# Patient Record
Sex: MALE | Race: BLACK OR AFRICAN AMERICAN | NOT HISPANIC OR LATINO
[De-identification: names, ages, dates, MRNs, and addresses within clinical notes are randomized per-mention and may not be internally consistent; named-entity substitution may affect disease eponyms.]

---

## 2021-07-08 ENCOUNTER — APPOINTMENT (OUTPATIENT)
Dept: ANTEPARTUM | Facility: CLINIC | Age: 36
End: 2021-07-08
Payer: COMMERCIAL

## 2021-07-08 PROBLEM — Z00.00 ENCOUNTER FOR PREVENTIVE HEALTH EXAMINATION: Status: ACTIVE | Noted: 2021-07-08

## 2021-07-08 PROCEDURE — 99072 ADDL SUPL MATRL&STAF TM PHE: CPT

## 2021-07-08 PROCEDURE — 36415 COLL VENOUS BLD VENIPUNCTURE: CPT

## 2021-07-27 ENCOUNTER — NON-APPOINTMENT (OUTPATIENT)
Age: 36
End: 2021-07-27

## 2022-02-07 ENCOUNTER — EMERGENCY (EMERGENCY)
Facility: HOSPITAL | Age: 37
LOS: 1 days | Discharge: HOME | End: 2022-02-07
Attending: STUDENT IN AN ORGANIZED HEALTH CARE EDUCATION/TRAINING PROGRAM | Admitting: STUDENT IN AN ORGANIZED HEALTH CARE EDUCATION/TRAINING PROGRAM
Payer: COMMERCIAL

## 2022-02-07 VITALS
TEMPERATURE: 99 F | DIASTOLIC BLOOD PRESSURE: 81 MMHG | SYSTOLIC BLOOD PRESSURE: 123 MMHG | HEART RATE: 85 BPM | RESPIRATION RATE: 16 BRPM | OXYGEN SATURATION: 98 %

## 2022-02-07 VITALS
DIASTOLIC BLOOD PRESSURE: 91 MMHG | HEART RATE: 117 BPM | SYSTOLIC BLOOD PRESSURE: 139 MMHG | OXYGEN SATURATION: 100 % | RESPIRATION RATE: 20 BRPM | TEMPERATURE: 97 F

## 2022-02-07 LAB
ALBUMIN SERPL ELPH-MCNC: 3.5 G/DL — SIGNIFICANT CHANGE UP (ref 3.3–5)
ALP SERPL-CCNC: 93 U/L — SIGNIFICANT CHANGE UP (ref 40–120)
ALT FLD-CCNC: 25 U/L — SIGNIFICANT CHANGE UP (ref 4–41)
ANION GAP SERPL CALC-SCNC: 15 MMOL/L — HIGH (ref 7–14)
APPEARANCE UR: ABNORMAL
AST SERPL-CCNC: 24 U/L — SIGNIFICANT CHANGE UP (ref 4–40)
BACTERIA # UR AUTO: ABNORMAL
BASE EXCESS BLDV CALC-SCNC: 0.4 MMOL/L — SIGNIFICANT CHANGE UP (ref -2–3)
BASOPHILS # BLD AUTO: 0.02 K/UL — SIGNIFICANT CHANGE UP (ref 0–0.2)
BASOPHILS NFR BLD AUTO: 0.3 % — SIGNIFICANT CHANGE UP (ref 0–2)
BILIRUB SERPL-MCNC: 0.3 MG/DL — SIGNIFICANT CHANGE UP (ref 0.2–1.2)
BILIRUB UR-MCNC: NEGATIVE — SIGNIFICANT CHANGE UP
BLOOD GAS VENOUS COMPREHENSIVE RESULT: SIGNIFICANT CHANGE UP
BUN SERPL-MCNC: 8 MG/DL — SIGNIFICANT CHANGE UP (ref 7–23)
CALCIUM SERPL-MCNC: 9.2 MG/DL — SIGNIFICANT CHANGE UP (ref 8.4–10.5)
CHLORIDE BLDV-SCNC: 100 MMOL/L — SIGNIFICANT CHANGE UP (ref 96–108)
CHLORIDE SERPL-SCNC: 94 MMOL/L — LOW (ref 98–107)
CO2 BLDV-SCNC: 25.7 MMOL/L — SIGNIFICANT CHANGE UP (ref 22–26)
CO2 SERPL-SCNC: 23 MMOL/L — SIGNIFICANT CHANGE UP (ref 22–31)
COLOR SPEC: YELLOW — SIGNIFICANT CHANGE UP
CREAT SERPL-MCNC: 0.86 MG/DL — SIGNIFICANT CHANGE UP (ref 0.5–1.3)
DIFF PNL FLD: ABNORMAL
EOSINOPHIL # BLD AUTO: 0.11 K/UL — SIGNIFICANT CHANGE UP (ref 0–0.5)
EOSINOPHIL NFR BLD AUTO: 1.6 % — SIGNIFICANT CHANGE UP (ref 0–6)
EPI CELLS # UR: 0 /HPF — SIGNIFICANT CHANGE UP (ref 0–5)
FLUAV AG NPH QL: SIGNIFICANT CHANGE UP
FLUBV AG NPH QL: SIGNIFICANT CHANGE UP
GAS PNL BLDV: 133 MMOL/L — LOW (ref 136–145)
GLUCOSE BLDV-MCNC: 93 MG/DL — SIGNIFICANT CHANGE UP (ref 70–99)
GLUCOSE SERPL-MCNC: 90 MG/DL — SIGNIFICANT CHANGE UP (ref 70–99)
GLUCOSE UR QL: NEGATIVE — SIGNIFICANT CHANGE UP
HCO3 BLDV-SCNC: 25 MMOL/L — SIGNIFICANT CHANGE UP (ref 22–29)
HCT VFR BLD CALC: 34.5 % — LOW (ref 39–50)
HCT VFR BLDA CALC: 35 % — LOW (ref 39–51)
HGB BLD CALC-MCNC: 11.6 G/DL — LOW (ref 13–17)
HGB BLD-MCNC: 12.1 G/DL — LOW (ref 13–17)
HIV 1+2 AB+HIV1 P24 AG SERPL QL IA: SIGNIFICANT CHANGE UP
HYALINE CASTS # UR AUTO: 2 /LPF — SIGNIFICANT CHANGE UP (ref 0–7)
IANC: 5.18 K/UL — SIGNIFICANT CHANGE UP (ref 1.5–8.5)
IMM GRANULOCYTES NFR BLD AUTO: 0.4 % — SIGNIFICANT CHANGE UP (ref 0–1.5)
KETONES UR-MCNC: NEGATIVE — SIGNIFICANT CHANGE UP
LACTATE BLDV-MCNC: 1.7 MMOL/L — SIGNIFICANT CHANGE UP (ref 0.5–2)
LEUKOCYTE ESTERASE UR-ACNC: ABNORMAL
LYMPHOCYTES # BLD AUTO: 0.92 K/UL — LOW (ref 1–3.3)
LYMPHOCYTES # BLD AUTO: 13.1 % — SIGNIFICANT CHANGE UP (ref 13–44)
MCHC RBC-ENTMCNC: 28.6 PG — SIGNIFICANT CHANGE UP (ref 27–34)
MCHC RBC-ENTMCNC: 35.1 GM/DL — SIGNIFICANT CHANGE UP (ref 32–36)
MCV RBC AUTO: 81.6 FL — SIGNIFICANT CHANGE UP (ref 80–100)
MONOCYTES # BLD AUTO: 0.78 K/UL — SIGNIFICANT CHANGE UP (ref 0–0.9)
MONOCYTES NFR BLD AUTO: 11.1 % — SIGNIFICANT CHANGE UP (ref 2–14)
NEUTROPHILS # BLD AUTO: 5.18 K/UL — SIGNIFICANT CHANGE UP (ref 1.8–7.4)
NEUTROPHILS NFR BLD AUTO: 73.5 % — SIGNIFICANT CHANGE UP (ref 43–77)
NITRITE UR-MCNC: POSITIVE
NRBC # BLD: 0 /100 WBCS — SIGNIFICANT CHANGE UP
NRBC # FLD: 0 K/UL — SIGNIFICANT CHANGE UP
NT-PROBNP SERPL-SCNC: 11 PG/ML — SIGNIFICANT CHANGE UP
PCO2 BLDV: 37 MMHG — LOW (ref 42–55)
PH BLDV: 7.43 — SIGNIFICANT CHANGE UP (ref 7.32–7.43)
PH UR: 6.5 — SIGNIFICANT CHANGE UP (ref 5–8)
PLATELET # BLD AUTO: 360 K/UL — SIGNIFICANT CHANGE UP (ref 150–400)
PO2 BLDV: 45 MMHG — SIGNIFICANT CHANGE UP
POTASSIUM BLDV-SCNC: 3.7 MMOL/L — SIGNIFICANT CHANGE UP (ref 3.5–5.1)
POTASSIUM SERPL-MCNC: 3.7 MMOL/L — SIGNIFICANT CHANGE UP (ref 3.5–5.3)
POTASSIUM SERPL-SCNC: 3.7 MMOL/L — SIGNIFICANT CHANGE UP (ref 3.5–5.3)
PROCALCITONIN SERPL-MCNC: 0.31 NG/ML — HIGH (ref 0.02–0.1)
PROT SERPL-MCNC: 8.1 G/DL — SIGNIFICANT CHANGE UP (ref 6–8.3)
PROT UR-MCNC: ABNORMAL
RBC # BLD: 4.23 M/UL — SIGNIFICANT CHANGE UP (ref 4.2–5.8)
RBC # FLD: 12.8 % — SIGNIFICANT CHANGE UP (ref 10.3–14.5)
RBC CASTS # UR COMP ASSIST: 1 /HPF — SIGNIFICANT CHANGE UP (ref 0–4)
RSV RNA NPH QL NAA+NON-PROBE: SIGNIFICANT CHANGE UP
SAO2 % BLDV: 78 % — SIGNIFICANT CHANGE UP
SARS-COV-2 RNA SPEC QL NAA+PROBE: SIGNIFICANT CHANGE UP
SODIUM SERPL-SCNC: 132 MMOL/L — LOW (ref 135–145)
SP GR SPEC: 1.01 — SIGNIFICANT CHANGE UP (ref 1–1.05)
TROPONIN T, HIGH SENSITIVITY RESULT: <6 NG/L — SIGNIFICANT CHANGE UP
UROBILINOGEN FLD QL: ABNORMAL
WBC # BLD: 7.04 K/UL — SIGNIFICANT CHANGE UP (ref 3.8–10.5)
WBC # FLD AUTO: 7.04 K/UL — SIGNIFICANT CHANGE UP (ref 3.8–10.5)
WBC UR QL: 62 /HPF — HIGH (ref 0–5)

## 2022-02-07 PROCEDURE — 71045 X-RAY EXAM CHEST 1 VIEW: CPT | Mod: 26

## 2022-02-07 PROCEDURE — 76870 US EXAM SCROTUM: CPT | Mod: 26

## 2022-02-07 PROCEDURE — 99285 EMERGENCY DEPT VISIT HI MDM: CPT

## 2022-02-07 RX ORDER — CEFTRIAXONE 500 MG/1
1000 INJECTION, POWDER, FOR SOLUTION INTRAMUSCULAR; INTRAVENOUS ONCE
Refills: 0 | Status: COMPLETED | OUTPATIENT
Start: 2022-02-07 | End: 2022-02-07

## 2022-02-07 RX ORDER — KETOROLAC TROMETHAMINE 30 MG/ML
15 SYRINGE (ML) INJECTION ONCE
Refills: 0 | Status: DISCONTINUED | OUTPATIENT
Start: 2022-02-07 | End: 2022-02-07

## 2022-02-07 RX ORDER — SODIUM CHLORIDE 9 MG/ML
2000 INJECTION, SOLUTION INTRAVENOUS ONCE
Refills: 0 | Status: COMPLETED | OUTPATIENT
Start: 2022-02-07 | End: 2022-02-07

## 2022-02-07 RX ADMIN — CEFTRIAXONE 100 MILLIGRAM(S): 500 INJECTION, POWDER, FOR SOLUTION INTRAMUSCULAR; INTRAVENOUS at 17:41

## 2022-02-07 RX ADMIN — Medication 100 MILLIGRAM(S): at 17:41

## 2022-02-07 RX ADMIN — Medication 15 MILLIGRAM(S): at 15:39

## 2022-02-07 RX ADMIN — SODIUM CHLORIDE 2000 MILLILITER(S): 9 INJECTION, SOLUTION INTRAVENOUS at 13:30

## 2022-02-07 NOTE — ED ADULT TRIAGE NOTE - CHIEF COMPLAINT QUOTE
Pt c/o right testicular pain and swelling. HX prostitis. Also c.o fevers (102.0) at home and CP upon inspiration.

## 2022-02-07 NOTE — ED PROVIDER NOTE - ATTENDING CONTRIBUTION TO CARE
38 y/o M with PMH sciatica, chronic back pain , hx of enlarged prostate w/ ? prostatitis p/w right testicle pain and swelling. pt reports having fever tmax 102 along with right testicle pain and swelling. He states has had increased swelling in his tescticle along with pain. he states his semen has also become thicker. denies dysuria. denies acute back pain, no cva tenderness has hx of similar episode when he was diagnsoed with epidydimitis. denies recent std, recently had sexual intercourse w/o protection.   denies +fever, chills, chest pain, SOB, abdominal pain, diarrhea, dysuria, syncope, bleeding, new rash,weakness, numbness, blurred vision  + testicular swelling  ROS  otherwise negative as per HPI  Gen: Awake, Alert, WD, WN, NAD  Head:  NC/AT  Eyes:  PERRL, EOMI, Conjunctiva pink, lids normal, no scleral icterus  ENT: OP clear, no exudates, moist mucus membranes  Neck: supple, nontender, no meningismus, no JVD, trachea midline  Cardiac/CV:  S1 S2, RRR, no M/G/R  Respiratory/Pulm:  CTAB, good air movement, normal resp effort, no wheezes/stridor/retractions/rales/rhonchi  Gastrointestinal/Abdomen:  Soft, nontender, nondistended, +BS, no rebound/guarding  Back:  no CVAT, no MLT   exam: charpone pa  right tesicle w/ increased size compared to left , + cremasteric b/l  Ext:  warm, well perfused, moving all extremities spontaneously, no peripheral edema, distal pulses intact  Skin: intact, no rash  Neuro:  AAOx3, sensation intact, motor 5/5 x 4 extremities, normal gait, speech clear  mdm as above

## 2022-02-07 NOTE — ED PROVIDER NOTE - CLINICAL SUMMARY MEDICAL DECISION MAKING FREE TEXT BOX
38 y/o M with PMH sciatica, chronic back pain , hx of enlarged prostate w/ ? prostatitis p/w right testicle pain and swelling. concern for epidydimytis vs std w/ abscess. us, cbc, cmp, hiv, gc chluymydia, ua, , reassessment

## 2022-02-07 NOTE — ED PROVIDER NOTE - PHYSICAL EXAMINATION
On Physical Exam:  General: well appearing, in NAD, speaking clearly in full sentences and without difficulty; cooperative with exam  HEENT: PERRL, MMM  Neck: no neck tenderness, no nuchal rigidity  Cardiac: normal s1, s2; RRR; no MGR  Lungs: CTABL  Abdomen: soft nontender/nondistended  : no bladder tenderness or distension   Exam (Male): Un-Circumcised penis, external anatomy appears normal, no e/o priapism, no e/o trama. R testicular swelling, and tenderness, no erythema. No urethral discharge or bleeding. Cremasteric reflex intact b/l. Chaperone Dr. Sina Hernandez PA-S  EXAM WITH:   Skin: warm, intact, no rash  Extremities: no peripheral edema, no gross deformities  Neuro: no gross neurologic deficits

## 2022-02-07 NOTE — ED PROVIDER NOTE - OBJECTIVE STATEMENT
36 y/o M PMHx chronic prostatitis, epididymitis, Presents for 3 days of right testicular pain and swelling associated with fever, abdominal pain and constipation. Patient reports that his 36 y/o M PMHx chronic prostatitis, epididymitis, Presents for 3 days of right testicular pain and swelling associated with fever, suprapubic pain and constipation. Patient reports that he had a fever 2 weeks ago due to his chronic prostatitis which improved but then became worse 3 days ago with a Tmax of 103. The patient endorses a painful swollen R testicle that is tender and painful with ambulation, he states he was taking medication for the pain but it did not work so this morning he self medicated with alcohol. Patient states that he had ED for the past three days resolving this morning and engaged in intercourse despite the pain and reports that his ejaculate was "a cream color that was jello like." Patient states he has had constipation since Thursday but was able to have a smaller than usual bowel movement this morning. Patient reports occasional unprotected anal intercourse. Patient denies cough, SOB, CP, Dysuria, hematuria, hematochezia, lightheadedness, N/V/D. 38 y/o M PMHx chronic prostatitis, epididymitis, Presents for 3 days of right testicular pain and swelling associated with fever, suprapubic pain and constipation. Patient reports that he had a fever 2 weeks ago due to his chronic prostatitis which improved but then became worse 3 days ago with a Tmax of 103. The patient endorses a painful swollen R testicle that is tender and painful with ambulation, he states he was taking medication for the pain but it did not work so this morning he self medicated with alcohol. Patient states that he had ED for the past three days resolving this morning and engaged in intercourse despite the pain and reports that his ejaculate was "a cream color that was jello like." Patient states he has had "constipation" since Thursday but was able to have a smaller than usual bowel movements. Patient reports occasional unprotected anal intercourse. reports 1 sexual parner Patient denies cough, SOB, CP, Dysuria, hematuria, hematochezia, lightheadedness, N/V/D.

## 2022-02-07 NOTE — ED PROVIDER NOTE - PROGRESS NOTE DETAILS
Pt reassessed at bedside, feels well, pain controlled. Informed of workup in ED, reviewed lab and/or radiology results (when applicable) with patient/caregiver. Informed pt of plan for discharge with instructions to follow up with PMD. Pt/caregiver expressed understanding of plan and agrees with plan for discharge. Strict return precautions discussed with patient in layman's terms, patient demonstrated understanding of return precautions. Attending MD aware and agrees with plan. Jose Dozier PA-C discussed with patient results of BW and how additional tests are pending. advised of importance of urgent urology follow up patient understood and agreed.  Attending MD aware and agrees with plan Jose Dozier PA-C

## 2022-02-07 NOTE — ED PROVIDER NOTE - PATIENT PORTAL LINK FT
You can access the FollowMyHealth Patient Portal offered by [facility] by registering at the following website: [email or web address]. By joining [facility]’s FollowMyHealth portal, you will also be able to view your health information using other applications (apps) compatible with our system.

## 2022-02-07 NOTE — ED PROVIDER NOTE - NSFOLLOWUPINSTRUCTIONS_ED_ALL_ED_FT
You can follow up with the Urology Group listed below    [facility]  [address] [place] [address], [address]  [place] [address]  [phone]       Epididymo-Orchitis    WHAT YOU NEED TO KNOW:    Epididymo-orchitis is inflammation of your epididymis and testicle. The epididymis is a coiled tube inside your scrotum. It stores and carries sperm from your testicles to your penis. Epididymo-orchitis usually affects the epididymis and testicle on one side, but it may affect both sides.  Male Reproductive System    DISCHARGE INSTRUCTIONS:    Return to the emergency department if:    You have severe pain in your testicles.    Your symptoms become worse even after you start treatment with medicine.  Contact your healthcare provider if:    Your symptoms do not get better within 3 days of treatment or come back after treatment.    You have a hot, red, tender area on your testicles.    You have questions or concerns about your condition or care.  Medicines:    Antibiotics help treat a bacterial infection.    NSAIDs, such as ibuprofen, help decrease swelling, pain, and fever. This medicine is available with or without a doctor's order. NSAIDs can cause stomach bleeding or kidney problems in certain people. If you take blood thinner medicine, always ask if NSAIDs are safe for you. Always read the medicine label and follow directions. Do not give these medicines to children under 6 months of age without direction from your child's healthcare provider.    Acetaminophen decreases pain and fever. It is available without a doctor's order. Ask how much to take and how often to take it. Follow directions. Acetaminophen can cause liver damage if not taken correctly.    Prescription pain medicine may be given. Ask how to take this medicine safely. embed?    Take your medicine as directed. Contact your healthcare provider if you think your medicine is not helping or if you have side effects. Tell him or her if you are allergic to any medicine. Keep a list of the medicines, vitamins, and herbs you take. Include the amounts, and when and why you take them. Bring the list or the pill bottles to follow-up visits. Carry your medicine list with you in case of an emergency.  Self-care:    Apply ice on your testicles for 15 to 20 minutes every hour or as directed. Use an ice pack, or put crushed ice in a plastic bag. Cover it with a towel. Ice helps prevent tissue damage and decreases swelling and pain.    Rest in bed as directed. Elevate your scrotum when you sit or lie down to help reduce swelling and pain. You may be asked to do this by placing a rolled-up towel under your scrotum.    Scrotal support may be recommended. An athletic supporter provides scrotal support and may make you more comfortable when you stand. Ask your healthcare provider how to use an athletic supporter.    Do not lift heavy objects. You can make swelling worse if you lift heavy objects or strain.  Follow up with your doctor as directed: Write down your questions so you remember to ask them during your visits.    Orquiepididimitis    LO QUE NECESITA SABER:    La orquiepididimitis es la inflamación del epidídimo y de los testículos. El epidídimo es un conducto enroscado dentro de guerrero escroto. Éste almacena y transporta el esperma de los testículos al pene. La orquiepididimitis generalmente afecta al epidídimo y a los testículos en un lado del escroto, huong podría afectar ambos lados.  Sistema reproductor masculino    INSTRUCCIONES SOBRE EL MONTEZ HOSPITALARIA:    Regrese a la courtney de emergencias si:    Usted tiene dolor intenso en los testículos.    Meaghan síntomas empeoran aún después de comenzar el tratamiento con medicamento.  Comuníquese con guerrero médico si:    Meaghan síntomas no mejoran dentro de 3 días de recibir tratamiento o regresan después del tratamiento.    Usted tiene un área caliente, david o sensible en meaghan testículos.    Usted tiene preguntas o inquietudes acerca de guerrero condición o cuidado.  Medicamentos:    Los antibióticosayudan a tratar denisa infección bacteriana.    Los SKIP,meme el ibuprofeno, ayudan a disminuir la inflamación, el dolor y la fiebre. Natalia medicamento está disponible con o sin denisa receta médica. Los SKIP pueden causar sangrado estomacal o problemas renales en ciertas personas. Si usted esta tomando un anticoágulante, siempre pregunte si los AINEs son seguros para usted. Siempre gaby la etiqueta de natalia medicamento y siga las instrucciones. No administre natalia medicamento a niños menores de 6 meses de yu sin antes obtener la autorización de guerrero médico.    Acetaminofénalivia el dolor y baja la fiebre. Está disponible sin receta médica. Pregunte la cantidad y la frecuencia con que debe tomarlos. Siga las indicaciones. El acetaminofén puede causar daño en el hígado cuando no se sarah de forma correcta.    Puede administrarsepodrían administrarse. Pregunte cómo tomarse estos medicamentos de manera polanco. ¿Inclusión?    [place] meaghan medicamentos meme se le haya indicado.Consulte con guerrero médico si usted manohar que guerrero medicamento no le está ayudando o si presenta efectos secundarios. Infórmele si es alérgico a cualquier medicamento. Mantenga denisa lista actualizada de los medicamentos, las vitaminas y los productos herbales que sarah. Incluya los siguientes datos de los medicamentos: cantidad, frecuencia y motivo de administración. Traiga con usted la lista o los envases de las píldoras a meaghan citas de seguimiento. Lleve la lista de los medicamentos con usted en guille de denisa emergencia.  Cuidados personales:    Aplique hieloen los testículos de 15 a 20 minutos cada hora o meme se le indique. Use denisa compresa de hielo o ponga hielo triturado en denisa bolsa de plástico. Cúbrala con denisa toalla. El hielo ayuda a evitar daño al tejido y a disminuir la inflamación y el dolor.    Descanse en guerrero camasegún las indicaciones. Eleve guerrero escroto cuando se siente o se acueste para ayudar a reducir la inflamación y el dolor. Podrían pedirle que avery esto colocando denisa toalla enrollada bajo guerrero escroto.    Soporte escrotalpodría recomendarse. Un soporte atlético proporciona soporte al escroto y podría hacerlo sentir más cómodo cuando esté de pie. Pregunte a guerrero médico cómo usar un soporte atlético.    No levante objetos pesados.Usted podría empeorar la inflamación si levanta objetos pesados o si se esfuerza.  Acuda a la consulta de control con guerrero médico según las indicaciones:Anote meaghan preguntas para que se acuerde de hacerlas rafael meaghan visitas. Please take 1 tablet of Doxycycline twice a day for 10 days.   You can follow up with the Urology Group listed below    [facility]  [address] [place] [address], [address]  [place] [address]  [phone]       Epididymo-Orchitis    WHAT YOU NEED TO KNOW:    Epididymo-orchitis is inflammation of your epididymis and testicle. The epididymis is a coiled tube inside your scrotum. It stores and carries sperm from your testicles to your penis. Epididymo-orchitis usually affects the epididymis and testicle on one side, but it may affect both sides.  Male Reproductive System    DISCHARGE INSTRUCTIONS:    Return to the emergency department if:    You have severe pain in your testicles.    Your symptoms become worse even after you start treatment with medicine.  Contact your healthcare provider if:    Your symptoms do not get better within 3 days of treatment or come back after treatment.    You have a hot, red, tender area on your testicles.    You have questions or concerns about your condition or care.  Medicines:    Antibiotics help treat a bacterial infection.    NSAIDs, such as ibuprofen, help decrease swelling, pain, and fever. This medicine is available with or without a doctor's order. NSAIDs can cause stomach bleeding or kidney problems in certain people. If you take blood thinner medicine, always ask if NSAIDs are safe for you. Always read the medicine label and follow directions. Do not give these medicines to children under 6 months of age without direction from your child's healthcare provider.    Acetaminophen decreases pain and fever. It is available without a doctor's order. Ask how much to take and how often to take it. Follow directions. Acetaminophen can cause liver damage if not taken correctly.    Prescription pain medicine may be given. Ask how to take this medicine safely. embed?    Take your medicine as directed. Contact your healthcare provider if you think your medicine is not helping or if you have side effects. Tell him or her if you are allergic to any medicine. Keep a list of the medicines, vitamins, and herbs you take. Include the amounts, and when and why you take them. Bring the list or the pill bottles to follow-up visits. Carry your medicine list with you in case of an emergency.  Self-care:    Apply ice on your testicles for 15 to 20 minutes every hour or as directed. Use an ice pack, or put crushed ice in a plastic bag. Cover it with a towel. Ice helps prevent tissue damage and decreases swelling and pain.    Rest in bed as directed. Elevate your scrotum when you sit or lie down to help reduce swelling and pain. You may be asked to do this by placing a rolled-up towel under your scrotum.    Scrotal support may be recommended. An athletic supporter provides scrotal support and may make you more comfortable when you stand. Ask your healthcare provider how to use an athletic supporter.    Do not lift heavy objects. You can make swelling worse if you lift heavy objects or strain.  Follow up with your doctor as directed: Write down your questions so you remember to ask them during your visits.    Orquiepididimitis    LO QUE NECESITA SABER:    La orquiepididimitis es la inflamación del epidídimo y de los testículos. El epidídimo es un conducto enroscado dentro de guerrero escroto. Éste almacena y transporta el esperma de los testículos al pene. La orquiepididimitis generalmente afecta al epidídimo y a los testículos en un lado del escroto, huong podría afectar ambos lados.  Sistema reproductor masculino    INSTRUCCIONES SOBRE EL MONTEZ HOSPITALARIA:    Regrese a la courtney de emergencias si:    Usted tiene dolor intenso en los testículos.    Meaghan síntomas empeoran aún después de comenzar el tratamiento con medicamento.  Comuníquese con guerrero médico si:    Meaghan síntomas no mejoran dentro de 3 días de recibir tratamiento o regresan después del tratamiento.    Usted tiene un área caliente, david o sensible en meaghan testículos.    Usted tiene preguntas o inquietudes acerca de guerrero condición o cuidado.  Medicamentos:    Los antibióticosayudan a tratar denisa infección bacteriana.    Los SKIP,meme el ibuprofeno, ayudan a disminuir la inflamación, el dolor y la fiebre. Natalia medicamento está disponible con o sin denisa receta médica. Los SKIP pueden causar sangrado estomacal o problemas renales en ciertas personas. Si usted esta tomando un anticoágulante, siempre pregunte si los AINEs son seguros para usted. Siempre gaby la etiqueta de natalia medicamento y siga las instrucciones. No administre natalia medicamento a niños menores de 6 meses de yu sin antes obtener la autorización de guerrero médico.    Acetaminofénalivia el dolor y baja la fiebre. Está disponible sin receta médica. Pregunte la cantidad y la frecuencia con que debe tomarlos. Siga las indicaciones. El acetaminofén puede causar daño en el hígado cuando no se sarah de forma correcta.    Puede administrarsepodrían administrarse. Pregunte cómo tomarse estos medicamentos de manera polanco. ¿Inclusión?    [place] meaghan medicamentos meme se le haya indicado.Consulte con guerrero médico si usted manohar que guerrero medicamento no le está ayudando o si presenta efectos secundarios. Infórmele si es alérgico a cualquier medicamento. Mantenga denisa lista actualizada de los medicamentos, las vitaminas y los productos herbales que sarah. Incluya los siguientes datos de los medicamentos: cantidad, frecuencia y motivo de administración. Traiga con usted la lista o los envases de las píldoras a meaghan citas de seguimiento. Lleve la lista de los medicamentos con usted en guille de denisa emergencia.  Cuidados personales:    Aplique hieloen los testículos de 15 a 20 minutos cada hora o meme se le indique. Use denisa compresa de hielo o ponga hielo triturado en denisa bolsa de plástico. Cúbrala con denisa toalla. El hielo ayuda a evitar daño al tejido y a disminuir la inflamación y el dolor.    Descanse en guerrero camasegún las indicaciones. Eleve guerrero escroto cuando se siente o se acueste para ayudar a reducir la inflamación y el dolor. Podrían pedirle que avery esto colocando denisa toalla enrollada bajo guerrero escroto.    Soporte escrotalpodría recomendarse. Un soporte atlético proporciona soporte al escroto y podría hacerlo sentir más cómodo cuando esté de pie. Pregunte a guerrero médico cómo usar un soporte atlético.    No levante objetos pesados.Usted podría empeorar la inflamación si levanta objetos pesados o si se esfuerza.  Acuda a la consulta de control con guerrero médico según las indicaciones:Anote meaghan preguntas para que se acuerde de hacerlas rafael meaghan visitas.

## 2022-02-07 NOTE — ED ADULT NURSE NOTE - OBJECTIVE STATEMENT
Received pt in bed  A and OX 3in NAD resting comfortably, c.o right testicular pain and swelling for the past week getting worse, pt also reports "pus in eyes" denies any urinary complaints, abd soft non distended non tender, instructed pt to refrain from food at this time. Orders noted and completed

## 2022-02-08 PROBLEM — Z87.438 PERSONAL HISTORY OF OTHER DISEASES OF MALE GENITAL ORGANS: Chronic | Status: ACTIVE | Noted: 2022-02-07

## 2022-02-08 PROBLEM — N41.1 CHRONIC PROSTATITIS: Chronic | Status: ACTIVE | Noted: 2022-02-07

## 2022-02-08 LAB
N GONORRHOEA RRNA SPEC QL NAA+PROBE: SIGNIFICANT CHANGE UP
SPECIMEN SOURCE: SIGNIFICANT CHANGE UP

## 2022-02-09 ENCOUNTER — APPOINTMENT (OUTPATIENT)
Dept: UROLOGY | Facility: CLINIC | Age: 37
End: 2022-02-09
Payer: COMMERCIAL

## 2022-02-09 VITALS
SYSTOLIC BLOOD PRESSURE: 107 MMHG | OXYGEN SATURATION: 96 % | RESPIRATION RATE: 16 BRPM | WEIGHT: 195 LBS | BODY MASS INDEX: 30.61 KG/M2 | HEIGHT: 67 IN | TEMPERATURE: 97.8 F | HEART RATE: 100 BPM | DIASTOLIC BLOOD PRESSURE: 74 MMHG

## 2022-02-09 DIAGNOSIS — F17.200 NICOTINE DEPENDENCE, UNSPECIFIED, UNCOMPLICATED: ICD-10-CM

## 2022-02-09 DIAGNOSIS — Z78.9 OTHER SPECIFIED HEALTH STATUS: ICD-10-CM

## 2022-02-09 PROCEDURE — 99204 OFFICE O/P NEW MOD 45 MIN: CPT

## 2022-02-09 RX ORDER — LEVOFLOXACIN 500 MG/1
500 TABLET, FILM COATED ORAL DAILY
Qty: 14 | Refills: 0 | Status: ACTIVE | COMMUNITY
Start: 2022-02-09 | End: 1900-01-01

## 2022-02-09 RX ORDER — TAMSULOSIN HYDROCHLORIDE 0.4 MG/1
0.4 CAPSULE ORAL
Refills: 0 | Status: ACTIVE | COMMUNITY
Start: 2022-02-09

## 2022-02-09 NOTE — HISTORY OF PRESENT ILLNESS
[FreeTextEntry1] : Mr. Silva is a 37-year male with a history of chronic prostatitis who presents as an ER follow-up.\par \par To review, he has a history of chronic prostatitis and epididymitis.  He was seen in the emergency department at [facility] on February 7 with right testicular pain, swelling, fever, suprapubic pain, and constipation.  He reported a fever with a T-max of 103, and a right swollen testicle.  He was discharged from the emergency department with doxycycline for 10 days, with plans for urology follow-up.\par \par Labs from the ER notable for:\par 2/7/2022:\par WBC: 7.04\par Cr 0.86\par UA: nitrite / LE (+), 1 rbc, 62 wbc\par Urine culture: >100k CFU e. coli\par G/C (urine): Negative\par HIV: Negative\par \par Imaging from the ED is notable for:\par Scrotal US 2/7/2022\par IMPRESSION:\par Right sided epididymoorchitis.\par Small right hydrocele.\par \par Currently, he feels better.  He is no longer having fevers or chills, however he still has right hemiscrotal enlargement and pain.  He denies dysuria, but does report he has "hot urine".  He reports that his symptoms started on Friday evening with fevers and chills, and he felt bad through the weekend.  On Monday morning he noticed the right scrotum was enlarged and he presented to the ER.\par \par At baseline, he does report a weak stream, frequency, especially with drinking alcohol, and sensation of incomplete emptying.  He takes tamsulosin which he gets from his father.  He does report he had an episode of epididymitis in 2015 while incarcerated.  He reports that he saw a urologist locally, who performed a cystoscopy which demonstrated "scar tissue ".  No history of urologic surgeries.  No history of catheter placement or urethral intubation.  No history of  trauma\par \par AUASS: 19 (4/4/1/1/4/3/2) QOL 3

## 2022-02-09 NOTE — PHYSICAL EXAM
[General Appearance - Well Developed] : well developed [General Appearance - Well Nourished] : well nourished [Heart Rate And Rhythm] : Heart rate and rhythm were normal [] : no respiratory distress [Respiration, Rhythm And Depth] : normal respiratory rhythm and effort [Bowel Sounds] : normal bowel sounds [Costovertebral Angle Tenderness] : no ~M costovertebral angle tenderness [Urethral Meatus] : meatus normal [Urinary Bladder Findings] : the bladder was normal on palpation [Normal Station and Gait] : the gait and station were normal for the patient's age [Skin Color & Pigmentation] : normal skin color and pigmentation [No Focal Deficits] : no focal deficits [Oriented To Time, Place, And Person] : oriented to person, place, and time [No Palpable Adenopathy] : no palpable adenopathy [FreeTextEntry1] : Left testicle smooth, nontender.  Right hemiscrotum is enlarged, approximately size of a hard-boiled egg.  The right epididymis is firm, indurated without fluctuance.  Testicle and epididymis are tender to palpation. Refused JOSE

## 2022-02-09 NOTE — HISTORY OF PRESENT ILLNESS
[FreeTextEntry1] : Mr. Silva is a 37-year male with a history of chronic prostatitis who presents as an ER follow-up.\par \par To review, he has a history of chronic prostatitis and epididymitis.  He was seen in the emergency department at [facility] on February 7 with right testicular pain, swelling, fever, suprapubic pain, and constipation.  He reported a fever with a T-max of 103, and a right swollen testicle.  He was discharged from the emergency department with doxycycline for 10 days, with plans for urology follow-up.\par \par Labs from the ER notable for:\par 2/7/2022:\par WBC: 7.04\par Cr 0.86\par UA: nitrite / LE (+), 1 rbc, 62 wbc\par Urine culture: >100k CFU e. coli\par G/C (urine): Negative\par HIV: Negative\par \par Imaging from the ED is notable for:\par Scrotal US 2/7/2022\par IMPRESSION:\par Right sided epididymoorchitis.\par Small right hydrocele.\par \par Currently, he feels better.  He is no longer having fevers or chills, however he still has right hemiscrotal enlargement and pain.  He denies dysuria, but does report he has "hot urine".  He reports that his symptoms started on Friday evening with fevers and chills, and he felt bad through the weekend.  On Monday morning he noticed the right scrotum was enlarged and he presented to the ER.\par \par At baseline, he does report a weak stream, frequency, especially with drinking alcohol, and sensation of incomplete emptying.  He takes tamsulosin which he gets from his father.  He does report he had an episode of epididymitis in 2015 while incarcerated.  He reports that he saw a urologist locally, who performed a cystoscopy which demonstrated "scar tissue ".  No history of urologic surgeries.  No history of catheter placement or urethral intubation.  No history of  trauma\par \par AUASS: 19 (4/4/1/1/4/3/2) QOL 3

## 2022-02-09 NOTE — ASSESSMENT
[FreeTextEntry1] : Mr Silva is a 37 y.o. M who presents as an ED follow-up for epididymitis.  He has been on doxycycline.  His urine culture from the ER is positive for E. coli, and he has an ultrasound demonstrating increased vascularity of the right epididymis, consistent with epididymitis.\par \par Regarding the acute management of his epididymitis, discussed that we should transition his antibiotics to treat his active urologic infection.  Discussed a 2 week course of levaquin.  Discussed the side effects.  It does not seem that he has a epididymal abscess, and appears clinically stable at this time, therefore I do not believe he requires drainage.\par \par Regarding the long-term management, this is his second episode, and he does report he was told he had  "a blockage" on prior cystoscopy from 2015. He also has some lower urinary tract symptoms.  It is possible he has a urethral stricture, which is causing high-pressure voiding leading to prostatitis and epididymitis.  I discussed that once he is complete his antibiotic treatment, I would like him to come back for a cystoscopy.  He agrees.\par - LEvaquin x 14 days. Side effects discussed\par - RV 4 weeks for cystoscopy

## 2022-02-12 LAB
CULTURE RESULTS: SIGNIFICANT CHANGE UP
CULTURE RESULTS: SIGNIFICANT CHANGE UP
SPECIMEN SOURCE: SIGNIFICANT CHANGE UP
SPECIMEN SOURCE: SIGNIFICANT CHANGE UP

## 2022-02-14 LAB
-  AMIKACIN: SIGNIFICANT CHANGE UP
-  AMOXICILLIN/CLAVULANIC ACID: SIGNIFICANT CHANGE UP
-  AMPICILLIN/SULBACTAM: SIGNIFICANT CHANGE UP
-  AMPICILLIN: SIGNIFICANT CHANGE UP
-  AZTREONAM: SIGNIFICANT CHANGE UP
-  CEFAZOLIN: SIGNIFICANT CHANGE UP
-  CEFEPIME: SIGNIFICANT CHANGE UP
-  CEFOTAXIME: SIGNIFICANT CHANGE UP
-  CEFOXITIN: SIGNIFICANT CHANGE UP
-  CEFTAZIDIME: SIGNIFICANT CHANGE UP
-  CEFTRIAXONE: SIGNIFICANT CHANGE UP
-  CEFUROXIME: SIGNIFICANT CHANGE UP
-  CIPROFLOXACIN: SIGNIFICANT CHANGE UP
-  ERTAPENEM: SIGNIFICANT CHANGE UP
-  GENTAMICIN: SIGNIFICANT CHANGE UP
-  IMIPENEM: SIGNIFICANT CHANGE UP
-  LEVOFLOXACIN: SIGNIFICANT CHANGE UP
-  MEROPENEM: SIGNIFICANT CHANGE UP
-  MINOCYCLINE: SIGNIFICANT CHANGE UP
-  NITROFURANTOIN: SIGNIFICANT CHANGE UP
-  PIPERACILLIN/TAZOBACTAM: SIGNIFICANT CHANGE UP
-  TIGECYCLINE: SIGNIFICANT CHANGE UP
-  TOBRAMYCIN: SIGNIFICANT CHANGE UP
-  TRIMETHOPRIM/SULFAMETHOXAZOLE: SIGNIFICANT CHANGE UP
CULTURE RESULTS: SIGNIFICANT CHANGE UP
METHOD TYPE: SIGNIFICANT CHANGE UP
ORGANISM # SPEC MICROSCOPIC CNT: SIGNIFICANT CHANGE UP
ORGANISM # SPEC MICROSCOPIC CNT: SIGNIFICANT CHANGE UP
SPECIMEN SOURCE: SIGNIFICANT CHANGE UP

## 2022-03-31 ENCOUNTER — APPOINTMENT (OUTPATIENT)
Dept: UROLOGY | Facility: CLINIC | Age: 37
End: 2022-03-31
Payer: COMMERCIAL

## 2022-03-31 DIAGNOSIS — N41.9 INFLAMMATORY DISEASE OF PROSTATE, UNSPECIFIED: ICD-10-CM

## 2022-03-31 DIAGNOSIS — N45.1 EPIDIDYMITIS: ICD-10-CM

## 2022-03-31 DIAGNOSIS — R39.11 HESITANCY OF MICTURITION: ICD-10-CM

## 2022-03-31 PROCEDURE — 52000 CYSTOURETHROSCOPY: CPT

## 2022-06-30 ENCOUNTER — APPOINTMENT (OUTPATIENT)
Dept: UROLOGY | Facility: CLINIC | Age: 37
End: 2022-06-30

## 2023-03-13 ENCOUNTER — RX RENEWAL (OUTPATIENT)
Age: 38
End: 2023-03-13

## 2023-03-15 NOTE — ED PROVIDER NOTE - CROS ED GI ALL NEG
- - - Topical Steroids Counseling: I discussed with the patient that prolonged use of topical steroids can result in the increased appearance of superficial blood vessels (telangiectasias), lightening (hypopigmentation) and thinning of the skin (atrophy).  Patient understands to avoid using high potency steroids in skin folds, the groin or the face.  The patient verbalized understanding of the proper use and possible adverse effects of topical steroids.  All of the patient's questions and concerns were addressed.

## 2023-05-18 RX ORDER — TAMSULOSIN HYDROCHLORIDE 0.4 MG/1
0.4 CAPSULE ORAL
Qty: 90 | Refills: 3 | Status: ACTIVE | COMMUNITY
Start: 2022-02-09 | End: 1900-01-01

## 2023-06-01 ENCOUNTER — APPOINTMENT (OUTPATIENT)
Dept: UROLOGY | Facility: CLINIC | Age: 38
End: 2023-06-01